# Patient Record
Sex: MALE | Race: WHITE | NOT HISPANIC OR LATINO | Employment: OTHER | URBAN - NONMETROPOLITAN AREA
[De-identification: names, ages, dates, MRNs, and addresses within clinical notes are randomized per-mention and may not be internally consistent; named-entity substitution may affect disease eponyms.]

---

## 2018-09-19 ENCOUNTER — OFFICE VISIT (OUTPATIENT)
Dept: OTOLARYNGOLOGY | Facility: CLINIC | Age: 53
End: 2018-09-19

## 2018-09-19 ENCOUNTER — CLINICAL SUPPORT (OUTPATIENT)
Dept: AUDIOLOGY | Facility: CLINIC | Age: 53
End: 2018-09-19

## 2018-09-19 VITALS — BODY MASS INDEX: 38.82 KG/M2 | WEIGHT: 271.2 LBS | OXYGEN SATURATION: 97 % | HEIGHT: 70 IN

## 2018-09-19 DIAGNOSIS — H90.3 SENSORINEURAL HEARING LOSS, ASYMMETRICAL: Primary | ICD-10-CM

## 2018-09-19 DIAGNOSIS — H81.02 MENIERE'S DISEASE OF LEFT EAR: Primary | ICD-10-CM

## 2018-09-19 DIAGNOSIS — H90.3 ASYMMETRIC SNHL (SENSORINEURAL HEARING LOSS): ICD-10-CM

## 2018-09-19 PROCEDURE — 92567 TYMPANOMETRY: CPT | Performed by: AUDIOLOGIST

## 2018-09-19 PROCEDURE — 92557 COMPREHENSIVE HEARING TEST: CPT | Performed by: AUDIOLOGIST

## 2018-09-19 PROCEDURE — 99203 OFFICE O/P NEW LOW 30 MIN: CPT | Performed by: OTOLARYNGOLOGY

## 2018-09-19 RX ORDER — TRIAMTERENE AND HYDROCHLOROTHIAZIDE 37.5; 25 MG/1; MG/1
1 CAPSULE ORAL EVERY MORNING
Qty: 30 CAPSULE | Refills: 1 | Status: SHIPPED | OUTPATIENT
Start: 2018-09-19 | End: 2018-10-25 | Stop reason: DRUGHIGH

## 2018-09-19 NOTE — PROGRESS NOTES
STANDARD AUDIOMETRIC EVALUATION      Name:  Benji Husain  :  1965  Age:  53 y.o.  Date of Evaluation:  2018      HISTORY    Reason for visit:  Benji Husain is seen today for a hearing evaluation at the request of Dr. Kalpesh Kohli.  Patient reports that his left ear feels clogged.  He reports that this has been going on for a while.  He reports having bilateral tinnitus, which is worse in the left ear.  He reports occasional sharp pain in the left ear.  He reports that he wears hearing protection whenever he is around loud noises.      EVALUATION    See Audiogram    RESULTS        Otoscopy and Tympanometry 226 Hz :  Right Ear:  Otoscopy:  Clear ear canal          Tympanometry:  Middle ear function within normal limits    Left Ear:   Otoscopy:  Clear ear canal        Tympanometry:  Middle ear function within normal limits    Test technique:  Standard Audiometry     Pure Tone Audiometry:   Patient responded to pure tones at 5-30 dB for 250-8000 Hz in right ear, and at 15-60 dB for 250-8000 Hz in left ear.       Speech Audiometry:        Right Ear:  Speech Reception Threshold (SRT) was obtained at 0 dBHL                 Speech Discrimination scores were 100% in quiet when words were presented at 40 dBHL       Left Ear:  Speech Reception Threshold (SRT) was obtained at 20 dBHL                 Speech Discrimination scores were 100% in quiet when words were presented at 55 dBHL    Reliability:   good    IMPRESSIONS:  1.  Tympanometry results are consistent with Middle ear function within normal limits in both ears.  2.  Pure tone results are consistent with mild high frequency indeterminant hearing loss  for right ear, and within normal limits to moderately-severe reverse cookie bite sensorineural hearing loss in left ear.     RECOMMENDATIONS:  Patient is seeing the Ear Nose and Throat physician immediately following this examination.  It was a pleasure seeing Benji Husain in Audiology  today.  We would be happy to do further testing or discuss these test as necessary.      This document has been electronically signed by NEVILLE Blackwell on September 19, 2018 2:58 PM          NEVILLE Blackwell  Licensed Audiologist

## 2018-09-19 NOTE — PROGRESS NOTES
"Subjective   Benji Husain is a 53 y.o. male.     History of Present Illness   Patient is here for evaluation of hearing loss.  Says he has noted it for years.  It's been worse on the left than the right.  It does fluctuate some days are better than others.  Does have some intermittent dizziness.  Has binaural tinnitus that is worse on the left than the right.  Says his left ear feels \"clogged\".  Occasional left-sided ear pain that is sharp and brief in duration.  No otorrhea, no previous otologic surgery.  Nothing in particular brought this on.  Some exposure to loud noise, use his ear protection.  No family history of hearing loss at an early age.      The following portions of the patient's history were reviewed and updated as appropriate: allergies, current medications, past family history, past medical history, past social history, past surgical history and problem list.      Benji Husain reports that he has never smoked. He has never used smokeless tobacco.  Patient is not a tobacco user and has not been counseled for use of tobacco products    No family history on file.  Negative for hearing loss at an early age  No Known Allergies      Current Outpatient Prescriptions:   •  triamterene-hydrochlorothiazide (DYAZIDE) 37.5-25 MG per capsule, Take 1 capsule by mouth Every Morning., Disp: 30 capsule, Rfl: 1    No past medical history on file.  Denies hypertension, coronary artery disease, diabetes    Review of Systems   Constitutional: Negative for fever.   HENT: Positive for hearing loss.    All other systems reviewed and are negative.          Objective   Physical Exam  General: Well-developed well-nourished male in no acute distress.  Alert and oriented ×3. Head: Normocephalic. Face: Symmetrical strength and appearance. PERRL. EOMI. Voice:Strong. Speech:Fluent  Ears: External ears no deformity, canals no discharge, tympanic membranes intact clear and mobile bilaterally.  Nose: Nares show no " discharge mass polyp or purulence.  Boggy mucosa is present.  No gross external deformity.  Septum: To the right  Oral cavity: Lips and gums without lesions.  Tongue and floor of mouth without lesions.  Parotid and submandibular ducts unobstructed.  No mucosal lesions on the buccal mucosa or vestibule of the mouth.  Pharynx: No erythema exudate mass or ulcer  Neck: No lymphadenopathy.  No thyromegaly.  Trachea and larynx midline.  No masses in the parotid or submandibular glands.    Audiogram is obtained and reviewed and shows a very mild high frequency sensorineural hearing loss on the right with a left-sided low frequency sensorineural component rising to normal at 2000 Hz before dropping again to moderately severe hearing loss in the high pitches.  Tympanograms are type A bilaterally.  Discrimination scores are 100% bilaterally.    Assessment/Plan   Benji was seen today for hearing loss.    Diagnoses and all orders for this visit:    Meniere's disease of left ear    Asymmetric SNHL (sensorineural hearing loss)    Other orders  -     triamterene-hydrochlorothiazide (DYAZIDE) 37.5-25 MG per capsule; Take 1 capsule by mouth Every Morning.      Plan: History and audiometric findings are consistent with Ménière's disease of the left ear.  Will start Dyazide 37.5/25 one by mouth daily along with advising a low salt diet.  Reevaluation in 1 month with repeat audiogram, call sooner for problems.

## 2018-10-23 ENCOUNTER — CLINICAL SUPPORT (OUTPATIENT)
Dept: AUDIOLOGY | Facility: CLINIC | Age: 53
End: 2018-10-23

## 2018-10-23 DIAGNOSIS — H90.3 SENSORINEURAL HEARING LOSS, ASYMMETRICAL: Primary | ICD-10-CM

## 2018-10-23 PROCEDURE — 92567 TYMPANOMETRY: CPT | Performed by: AUDIOLOGIST

## 2018-10-23 PROCEDURE — 92557 COMPREHENSIVE HEARING TEST: CPT | Performed by: AUDIOLOGIST

## 2018-10-23 NOTE — PROGRESS NOTES
STANDARD AUDIOMETRIC EVALUATION      Name:  Benji Husain  :  1965  Age:  53 y.o.  Date of Evaluation:  10/23/2018      HISTORY    Reason for visit:  Benji Husain is seen today for a hearing evaluation at the request of Dr. Kalpesh Kohli.  Patient reports that his ears cleared up for one week and that he could hear better.  He reports that they went back to being clogged and he is having trouble hearing again.      EVALUATION    See Audiogram    RESULTS        Otoscopy and Tympanometry 226 Hz :  Right Ear:  Otoscopy:  Clear ear canal          Tympanometry:  Middle ear function within normal limits    Left Ear:   Otoscopy:  Clear ear canal        Tympanometry:  Middle ear function within normal limits    Test technique:  Standard Audiometry     Pure Tone Audiometry:   Patient responded to pure tones at 5-35 dB for 250-8000 Hz in right ear, and at 15-60 dB for 250-8000 Hz in left ear.       Speech Audiometry:        Right Ear:  Speech Reception Threshold (SRT) was obtained at 0 dBHL                 Speech Discrimination scores were 100% in quiet when words were presented at 40 dBHL       Left Ear:  Speech Reception Threshold (SRT) was obtained at 20 dBHL                 Speech Discrimination scores were 100% in quiet when words were presented at 55 dBHL    Reliability:   good    IMPRESSIONS:  1.  Tympanometry results are consistent with Middle ear function within normal limits in both ears.  2.  Pure tone results are consistent with mild high frequency indeterminant hearing loss  for right ear, and within normal limits to moderately-severe reverse cookie bite sensorineural hearing loss in left ear.     RECOMMENDATIONS:  Patient is seeing the Ear, Nose, and Throat physician at a later date to discuss the test results.  It was a pleasure seeing Benji Husain in Audiology today.  We would be happy to do further testing or discuss these test as necessary.      This document has been  electronically signed by NEVILLE Blackwell on October 23, 2018 10:52 AM          NEVILLE Blackwell  Licensed Audiologist

## 2018-10-25 ENCOUNTER — OFFICE VISIT (OUTPATIENT)
Dept: OTOLARYNGOLOGY | Facility: CLINIC | Age: 53
End: 2018-10-25

## 2018-10-25 VITALS — WEIGHT: 265 LBS | BODY MASS INDEX: 37.94 KG/M2 | OXYGEN SATURATION: 98 % | HEIGHT: 70 IN

## 2018-10-25 DIAGNOSIS — H81.02 MENIERE'S DISEASE OF LEFT EAR: Primary | ICD-10-CM

## 2018-10-25 DIAGNOSIS — H90.3 ASYMMETRIC SNHL (SENSORINEURAL HEARING LOSS): ICD-10-CM

## 2018-10-25 PROCEDURE — 99213 OFFICE O/P EST LOW 20 MIN: CPT | Performed by: OTOLARYNGOLOGY

## 2018-10-25 RX ORDER — TRIAMTERENE AND HYDROCHLOROTHIAZIDE 75; 50 MG/1; MG/1
1 TABLET ORAL DAILY
Qty: 30 TABLET | Refills: 2 | Status: SHIPPED | OUTPATIENT
Start: 2018-10-25 | End: 2019-02-12

## 2018-10-25 NOTE — PROGRESS NOTES
"Subjective   Benji Husain is a 53 y.o. male.       History of Present Illness   Patient was seen previously with asymmetrical sensorineural hearing loss with a low-frequency decrease on the left that was clinically consistent with Ménière's disease.  Was treated with triamterene/HCTZ and the patient reports his ear significantly improved until about a week ago when he felt that starting to \"stop up\" again.  He has been vigilant about a low salt diet.  No dizziness.  Underwent an audiogram 2 days ago but was unable to stay for the office visit that day.  This audiogram is personally reviewed and again shows asymmetrical sensorineural hearing loss with low frequency component worse on the left than the right.  Discrimination scores remained 100% bilaterally.  He ran out of the triamterene HCTZ 2 days ago and did not refill it pending this appointment.      The following portions of the patient's history were reviewed and updated as appropriate: allergies, current medications, past family history, past medical history, past social history, past surgical history and problem list.     reports that he has never smoked. He has never used smokeless tobacco.   Patient is not a tobacco user and has not been counseled for use of tobacco products      Review of Systems   Constitutional: Negative for fever.   HENT: Positive for hearing loss.    Neurological: Negative for dizziness.           Objective   Physical Exam  Ears: External ears no deformity, canals no discharge, tympanic membranes intact clear and mobile bilaterally.      Assessment/Plan   Benji was seen today for follow-up.    Diagnoses and all orders for this visit:    Meniere's disease of left ear    Asymmetric SNHL (sensorineural hearing loss)    Other orders  -     triamterene-hydrochlorothiazide (MAXZIDE) 75-50 MG per tablet; Take 1 tablet by mouth Daily.      Plan: Since he had significant subjective improvement for a period of time I think it would be " reasonable to try an increased dose of diuretic.  Prescription for triamterene/HCTZ 75/51 by mouth daily is sent to his pharmacy.  He is to maintain a low-salt diet.  Return in another month with a repeat audiogram.

## 2018-12-06 ENCOUNTER — OFFICE VISIT (OUTPATIENT)
Dept: OTOLARYNGOLOGY | Facility: CLINIC | Age: 53
End: 2018-12-06

## 2018-12-06 DIAGNOSIS — Z53.21 PROCEDURE AND TREATMENT NOT CARRIED OUT DUE TO PATIENT LEAVING PRIOR TO BEING SEEN BY HEALTH CARE PROVIDER: Primary | ICD-10-CM

## 2019-02-12 ENCOUNTER — OFFICE VISIT (OUTPATIENT)
Dept: OTOLARYNGOLOGY | Facility: CLINIC | Age: 54
End: 2019-02-12

## 2019-02-12 ENCOUNTER — CLINICAL SUPPORT (OUTPATIENT)
Dept: AUDIOLOGY | Facility: CLINIC | Age: 54
End: 2019-02-12

## 2019-02-12 VITALS — BODY MASS INDEX: 38.65 KG/M2 | RESPIRATION RATE: 17 BRPM | WEIGHT: 270 LBS | HEIGHT: 70 IN

## 2019-02-12 DIAGNOSIS — H90.3 ASYMMETRIC SNHL (SENSORINEURAL HEARING LOSS): ICD-10-CM

## 2019-02-12 DIAGNOSIS — H81.09 MENIERE'S DISEASE, UNSPECIFIED LATERALITY: Primary | ICD-10-CM

## 2019-02-12 DIAGNOSIS — H90.3 SENSORINEURAL HEARING LOSS, ASYMMETRICAL: Primary | ICD-10-CM

## 2019-02-12 DIAGNOSIS — H81.02 MENIERE'S DISEASE OF LEFT EAR: Primary | ICD-10-CM

## 2019-02-12 PROCEDURE — 99213 OFFICE O/P EST LOW 20 MIN: CPT | Performed by: OTOLARYNGOLOGY

## 2019-02-12 PROCEDURE — 92557 COMPREHENSIVE HEARING TEST: CPT | Performed by: AUDIOLOGIST

## 2019-02-12 PROCEDURE — 92567 TYMPANOMETRY: CPT | Performed by: AUDIOLOGIST

## 2019-02-12 RX ORDER — TRIAMTERENE AND HYDROCHLOROTHIAZIDE 75; 50 MG/1; MG/1
1 TABLET ORAL DAILY
Qty: 90 TABLET | Refills: 3 | Status: SHIPPED | OUTPATIENT
Start: 2019-02-12 | End: 2021-01-05 | Stop reason: SDUPTHER

## 2019-02-12 RX ORDER — MECLIZINE HYDROCHLORIDE 25 MG/1
25 TABLET ORAL 3 TIMES DAILY PRN
Qty: 30 TABLET | Refills: 2 | Status: SHIPPED | OUTPATIENT
Start: 2019-02-12 | End: 2020-09-15 | Stop reason: SDUPTHER

## 2019-02-13 NOTE — PROGRESS NOTES
STANDARD AUDIOMETRIC EVALUATION      Name:  Benji Husain  :  1965  Age:  53 y.o.  Date of Evaluation:  2019      HISTORY    Reason for visit:  Benji Husain is seen today for a hearing evaluation at the request of Dr. Kalpesh Kohli.  Patient reports a history of Meniere's Disease in his left ear.  He states his left ear is ringing, and he has been feeling nausea.  He reports he can't hear, but he currently does not wear a hearing aid.      EVALUATION    See Audiogram    RESULTS        Otoscopy and Tympanometry 226 Hz :  Right Ear:  Otoscopy:  Clear ear canal          Tympanometry:  Middle ear function within normal limits    Left Ear:   Otoscopy:  Clear ear canal        Tympanometry:  Middle ear function within normal limits    Test technique:  Standard Audiometry     Pure Tone Audiometry:   Patient responded to pure tones at 5-40 dB for 250-8000 Hz in right ear, and at 30-65 dB for 250-8000 Hz in left ear.       Speech Audiometry:        Right Ear:  Speech Reception Threshold (SRT) was obtained at 0 dBHL                 Speech Discrimination scores were 100% in quiet when words were presented at 40 dBHL       Left Ear:  Speech Reception Threshold (SRT) was obtained at 40 dBHL, masked                 Speech Discrimination scores were 96% in masking noise when words were presented at  65 dBHL    Reliability:   good    IMPRESSIONS:  1.  Tympanometry results are consistent with Middle ear function within normal limits in both ears.  2.  Pure tone results are consistent with normal to mild high frequency   hearing loss for right ear, and mild to moderate reverse cookie bite mainly sensorineural hearing loss  in left ear.       RECOMMENDATIONS:  Patient is seeing the Ear Nose and Throat physician immediately following this examination.  It was a pleasure seeing Benji Husain in Audiology today.  We would be happy to do further testing or discuss these test as necessary.          This  document has been electronically signed by Reva Richards MS CCC-A on February 13, 2019 9:57 AM       Reva Richards MS CCC-A  Licensed Audiologist

## 2019-02-15 NOTE — PROGRESS NOTES
Subjective   Benji Husain is a 53 y.o. male.       History of Present Illness     Patient was seen previously with asymmetrical sensorineural hearing loss consistent with left-sided Ménière's disease.  Was treated with triamterene HCTZ.  Improved significantly but then subjectively decreased.  He has not been seen since October and ran out of triamterene/HCTZ 2 weeks ago.  He states during this time he has had significantly worsening dizziness.  He felt like his hearing was improving in January but is now worsened again.  No otorrhea.    The following portions of the patient's history were reviewed and updated as appropriate: allergies, current medications, past family history, past medical history, past social history, past surgical history and problem list.     reports that  has never smoked. he has never used smokeless tobacco.   Patient is not a tobacco user and has not been counseled for use of tobacco products      Review of Systems   HENT: Positive for hearing loss.    Neurological: Positive for dizziness.           Objective   Physical Exam  General: Well-developed well-nourished male in no acute distress.  Alert and oriented x3. Voice:Strong. Speech:Fluent  Ears: External ears no deformity, canals no discharge, tympanic membranes intact clear and mobile bilaterally.  Nose: Nares show no discharge mass polyp or purulence.  Boggy mucosa is present.  No gross external deformity.  Septum: Midline  Oral cavity: Lips and gums without lesions.  Tongue and floor of mouth without lesions.  Parotid and submandibular ducts unobstructed.  No mucosal lesions on the buccal mucosa or vestibule of the mouth.  Pharynx: No erythema exudate mass or ulcer  Neck: No lymphadenopathy.  No thyromegaly.  Trachea and larynx midline.  No masses in the parotid or submandibular glands.    Audiogram is obtained and reviewed and again shows significantly asymmetrical sensorineural hearing loss.  Has only a mild high-frequency loss  on the right.  Has a moderate rising to mild sensorineural hearing loss on the left.  Compared to October his hearing has worsened in most frequencies by 5-15 dB.  Tympanograms are type a bilaterally.  Discrimination scores are 100% on the right 96% on the left    Assessment/Plan   Benji was seen today for follow-up.    Diagnoses and all orders for this visit:    Meniere's disease of left ear    Asymmetric SNHL (sensorineural hearing loss)    Other orders  -     triamterene-hydrochlorothiazide (MAXZIDE) 75-50 MG per tablet; Take 1 tablet by mouth Daily.  -     meclizine (ANTIVERT) 25 MG tablet; Take 1 tablet by mouth 3 (Three) Times a Day As Needed for dizziness.        Plan: Restart triamterene/HCTZ 1 p.o. daily.  Prescription sent to his pharmacy for 90-day supply with 3 refills.  Encouraged him not to allow himself to go without the medication for an extended period of time.  Also will give him some meclizine as needed for symptomatic dizziness although hopefully that will resolve once he is back on his diuretic.  Continue low-salt diet.  Return in 6 months with another audiogram but call sooner if he worsens.

## 2019-10-29 ENCOUNTER — OFFICE VISIT (OUTPATIENT)
Dept: OTOLARYNGOLOGY | Facility: CLINIC | Age: 54
End: 2019-10-29

## 2019-10-29 ENCOUNTER — CLINICAL SUPPORT (OUTPATIENT)
Dept: AUDIOLOGY | Facility: CLINIC | Age: 54
End: 2019-10-29

## 2019-10-29 VITALS — OXYGEN SATURATION: 96 % | HEIGHT: 70 IN | WEIGHT: 269.2 LBS | BODY MASS INDEX: 38.54 KG/M2

## 2019-10-29 DIAGNOSIS — H90.3 ASYMMETRIC SNHL (SENSORINEURAL HEARING LOSS): ICD-10-CM

## 2019-10-29 DIAGNOSIS — H81.02 MENIERE'S DISEASE OF LEFT EAR: Primary | ICD-10-CM

## 2019-10-29 DIAGNOSIS — H93.13 TINNITUS OF BOTH EARS: ICD-10-CM

## 2019-10-29 DIAGNOSIS — IMO0001 BILATERAL ASYMMETRIC SENSORINEURAL HEARING LOSS: Primary | ICD-10-CM

## 2019-10-29 PROCEDURE — 99213 OFFICE O/P EST LOW 20 MIN: CPT | Performed by: OTOLARYNGOLOGY

## 2019-10-29 PROCEDURE — 92557 COMPREHENSIVE HEARING TEST: CPT | Performed by: AUDIOLOGIST

## 2019-10-29 NOTE — PROGRESS NOTES
Subjective   Benji Husain is a 54 y.o. male.       History of Present Illness   Patient has been seen previously with Ménière's disease of the left ear.  Had a sensorineural hearing loss that responded well to diuretic therapy.  Unfortunately ran out of his triamterene HCTZ and was seen back in February with significant decrease in hearing.  He was placed back on triamterene HCTZ.  States he took that for about a week and a half and his symptoms improved but he was having significant side effects including headaches, constipation, and discontinued the triamterene HCTZ at that point.  Feels like his hearing remains improved.  He is not having any significant dizziness at this point.      The following portions of the patient's history were reviewed and updated as appropriate: allergies, current medications, past family history, past medical history, past social history, past surgical history and problem list.     reports that he has never smoked. He has never used smokeless tobacco.   Patient is not a tobacco user and has not been counseled for use of tobacco products      Review of Systems   Constitutional: Negative for fever.           Objective   Physical Exam  Ears: External ears no deformity, canals no discharge, tympanic membranes intact clear and mobile bilaterally.  Nares: Boggy mucosa, no discharge, mass, polyp, purulence  Oral cavity: Lips and gums without lesions.  Tongue and floor of mouth without lesions.  Parotid and submandibular ducts unobstructed.  No mucosal lesions on the buccal mucosa or vestibule of the mouth.  Pharynx: No erythema exudate or mass  Neck: No lymphadenopathy.  No thyromegaly.  Trachea and larynx midline.  No masses in the parotid or submandibular glands.    Audiogram is obtained and reviewed and shows asymmetrical sensorineural hearing loss but significant improvement on the left compared to February of this year.  Discrimination scores are 96%  bilaterally.      Assessment/Plan   Benji was seen today for follow-up.    Diagnoses and all orders for this visit:    Meniere's disease of left ear    Asymmetric SNHL (sensorineural hearing loss)      Plan: Explained to the patient that his hearing has improved.  Would encourage him to continue a low-salt diet and caffeine avoidance.  As long as his symptoms remain well controlled I think is reasonable to avoid diuretic because he was having perceived side effects.  I told him if he experiences significant decrease in hearing and/or significant vertigo he would likely need to resume treatment.  If symptoms remain stable he is to return in a year with another hearing test but call sooner for worsening.

## 2019-10-29 NOTE — PROGRESS NOTES
STANDARD AUDIOMETRIC EVALUATION      Name:  Benji Husain  :  1965  Age:  54 y.o.  Date of Evaluation:  10/29/2019      HISTORY    Reason for visit:  Benji Husain was seen today for a hearing evaluation at the request of Dr. Kalpesh Kohli.   Mr. Husain has a history of asymmetric sensorineural hearing loss secondary to Meniere's Disease the the left ear being his poorer ear. He reported that he has not had any recent vertiginous episodes or aural pressure. He feels as if the hearing sensitivity in his left ear has improved since his last visit. He expressed that he started working out and no longer feels he needs the medication Dr. Kohli prescribed so he has stopped taking it. He reported the presence of constant ringing tinnitus bilaterally that is worse in the left ear. No other significant audiologic information was reported.      EVALUATION    See Audiogram    RESULTS        Otoscopy and Tympanometry 226 Hz :  Right Ear:  Otoscopy:  Visible ear drum          Tympanometry:  Did not test    Left Ear:   Otoscopy:  Visible ear drum        Tympanometry:  Did not test    Test technique:  Standard Audiometry     Pure Tone Audiometry:   Patient responded to pure tones at 5-50 dB for 250-8000 Hz in right ear, and at 20-60 dB for 250-8000 Hz in left ear.       Speech Audiometry:        Right Ear:  Speech Reception Threshold (SRT) was obtained at 5 dBHL                 Speech Discrimination scores were 96% in quiet when words were presented at 45 dBHL       Left Ear:  Speech Reception Threshold (SRT) was obtained at 20 dBHL                 Speech Discrimination scores were 96% in masking noise when words were presented at  60 dBHL    Reliability:   excellent    IMPRESSIONS:  1.  Tympanometry testing was not completed as it was not deemed medically necessary.  2.  Pure tone results are consistent with normal peripheral hearing sensitivity through 4000 Hz sloping to a moderate sensorineural hearing  loss in the right ear and a mild sloping to moderately severe sensorineural hearing loss in the left ear. A significant improvement in hearing sensitivity was noted in the left ear when compared to previous test results.      RECOMMENDATIONS:  Patient is seeing the Ear Nose and Throat physician immediately following this examination.  It was a pleasure seeing Benji Husain in Audiology today.  We would be happy to do further testing or discuss these test as necessary.              This document has been electronically signed by NEVILLE Forrester on October 29, 2019 8:35 AM   Encounter for palliative care

## 2020-09-15 RX ORDER — MECLIZINE HYDROCHLORIDE 25 MG/1
25 TABLET ORAL 3 TIMES DAILY PRN
Qty: 30 TABLET | Refills: 2 | Status: SHIPPED | OUTPATIENT
Start: 2020-09-15 | End: 2021-01-05 | Stop reason: SDUPTHER

## 2021-01-05 ENCOUNTER — OFFICE VISIT (OUTPATIENT)
Dept: OTOLARYNGOLOGY | Facility: CLINIC | Age: 56
End: 2021-01-05

## 2021-01-05 ENCOUNTER — CLINICAL SUPPORT (OUTPATIENT)
Dept: AUDIOLOGY | Facility: CLINIC | Age: 56
End: 2021-01-05

## 2021-01-05 VITALS — BODY MASS INDEX: 39.37 KG/M2 | HEIGHT: 70 IN | WEIGHT: 275 LBS | OXYGEN SATURATION: 96 %

## 2021-01-05 DIAGNOSIS — H93.13 TINNITUS OF BOTH EARS: ICD-10-CM

## 2021-01-05 DIAGNOSIS — H90.3 SENSORINEURAL HEARING LOSS, ASYMMETRICAL: Primary | ICD-10-CM

## 2021-01-05 DIAGNOSIS — H90.3 ASYMMETRIC SNHL (SENSORINEURAL HEARING LOSS): ICD-10-CM

## 2021-01-05 DIAGNOSIS — H81.02 MENIERE'S DISEASE OF LEFT EAR: Primary | ICD-10-CM

## 2021-01-05 PROCEDURE — 99214 OFFICE O/P EST MOD 30 MIN: CPT | Performed by: OTOLARYNGOLOGY

## 2021-01-05 PROCEDURE — 92557 COMPREHENSIVE HEARING TEST: CPT | Performed by: AUDIOLOGIST

## 2021-01-05 PROCEDURE — 92567 TYMPANOMETRY: CPT | Performed by: AUDIOLOGIST

## 2021-01-05 RX ORDER — TRIAMTERENE AND HYDROCHLOROTHIAZIDE 75; 50 MG/1; MG/1
1 TABLET ORAL DAILY
Qty: 90 TABLET | Refills: 3 | Status: SHIPPED | OUTPATIENT
Start: 2021-01-05

## 2021-01-05 RX ORDER — MECLIZINE HYDROCHLORIDE 25 MG/1
25 TABLET ORAL 3 TIMES DAILY PRN
Qty: 30 TABLET | Refills: 2 | Status: SHIPPED | OUTPATIENT
Start: 2021-01-05

## 2021-01-05 NOTE — PROGRESS NOTES
Subjective   Benji Husain is a 55 y.o. male.       History of Present Illness   Patient has left-sided Ménière's disease.  Only uses his diuretic when he is symptomatic.  Uses meclizine as needed for dizziness but is really only had 1 bad dizzy spell since he was last seen.  Says his hearing continues to fluctuate periodically but he does not think it is dramatically decreased.  Feels like it is worse today than it was 3 days ago.      The following portions of the patient's history were reviewed and updated as appropriate: allergies, current medications, past family history, past medical history, past social history, past surgical history and problem list.     reports that he has never smoked. He has never used smokeless tobacco.   Patient is not a tobacco user and has not been counseled for use of tobacco products      Review of Systems        Objective   Physical Exam  Ears normal    Audiogram is obtained and reviewed.  Shows a high-frequency sensorineural hearing loss at 8000 Hz only on the right and a low and high-frequency sensorineural hearing loss on the left.  Compared to his previous audiogram of October 2019 this is generally stable, slightly worse at 250 Hz while slightly improved at 500 Hz.  Tympanograms are type a bilaterally.  Discrimination scores are 100% on the right 96% on the left      Assessment/Plan   Diagnoses and all orders for this visit:    1. Meniere's disease of left ear (Primary)    2. Asymmetric SNHL (sensorineural hearing loss)    Other orders  -     triamterene-hydrochlorothiazide (MAXZIDE) 75-50 MG per tablet; Take 1 tablet by mouth Daily.  Dispense: 90 tablet; Refill: 3  -     meclizine (ANTIVERT) 25 MG tablet; Take 1 tablet by mouth 3 (Three) Times a Day As Needed for Dizziness.  Dispense: 30 tablet; Refill: 2      Plan: Will refill his diuretic.  Since he is getting satisfactory subjective results with as needed use may continue to use it as needed along with a low-salt  diet.  We will also refill his meclizine.  Return in a year with another hearing test, call sooner for problems

## 2021-01-05 NOTE — PROGRESS NOTES
STANDARD AUDIOMETRIC EVALUATION      Name:  Benji Husain  :  1965  Age:  55 y.o.  Date of Evaluation:  2021      HISTORY    Reason for visit:  Benji Husain is seen today for a yearly hearing test at the request of Dr. Kalpesh Kohli.  It has been previously reported that he has Meniere's Disease in his left ear, and and previous audiogram showed an asymmetrical hearing loss, worse in the left ear.  Today, Patient reports his hearing comes and goes.  He states he hears ringing which is sometimes loud in his left ear.        EVALUATION    See Audiogram    RESULTS        Otoscopy and Tympanometry 226 Hz :  Right Ear:  Otoscopy:  Clear ear canal          Tympanometry:  Middle ear function within normal limits    Left Ear:   Otoscopy:  Clear ear canal        Tympanometry:  Middle ear function within normal limits    Test technique:  Standard Audiometry     Pure Tone Audiometry:   Patient responded to pure tones at 5-45 dB for 250-8000 Hz in right ear, and at 15-55 dB for 250-8000 Hz in left ear.       Speech Audiometry:        Right Ear:  Speech Reception Threshold (SRT) was obtained at 0 dBHL                 Speech Discrimination scores were 100% in quiet when words were presented at 40 dBHL       Left Ear:  Speech Reception Threshold (SRT) was obtained at 10 dBHL                 Speech Discrimination scores were 96% in quiet when words were presented at 50 dBHL    Reliability:   good    IMPRESSIONS:  1.  Tympanometry results are consistent with Middle ear function within normal limits in both ears.  2.  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       RECOMMENDATIONS:  Patient is seeing the Ear Nose and Throat physician immediately following this examination.  It was a pleasure seeing Benji Husain in  Audiology today.  We would be happy to do further testing or discuss these test as necessary.          This document has been electronically signed by Reva Richards MS CCC-A on January 5, 2021 10:14 CST       Reva Richards MS CCC-A  Licensed Audiologist